# Patient Record
(demographics unavailable — no encounter records)

---

## 2024-11-25 NOTE — PHYSICAL EXAM
[General Appearance - Well Nourished] : well nourished [General Appearance - Well Developed] : well developed [Sclera] : the sclera and conjunctiva were normal [Hearing Threshold Finger Rub Not Coles] : hearing was normal [No CVA Tenderness] : no ~M costovertebral angle tenderness [No Spinal Tenderness] : no spinal tenderness [Nail Clubbing] : no clubbing  or cyanosis of the fingernails [Musculoskeletal - Swelling] : no joint swelling seen [Motor Tone] : muscle strength and tone were normal [FreeTextEntry1] : flexeible  [] : no rash [Skin Lesions] : no skin lesions [Affect] : the affect was normal [Mood] : the mood was normal

## 2024-11-25 NOTE — ASSESSMENT
[FreeTextEntry1] : 29 yo woman with history of morbid obesity, hidradenitis suppurativa, anxiety/depression, ADHD, hypermobility, fibromyalgia, migraine, sleep disturbance, asthma as a child.  Currently on disability for fibro/migraines presents for eval of fatigue.  Fatigue most likley multifactorial -depression, not sleeping, mild fibromyalgia   --patient is advise to start seeing psych given feeling of being overwhlemed --she is to consider a sleep apnea study  --will check labs  --start PT for spine , pending MRA head and neck  --obtain cardio w/u for her syncope  --declines cymbalta today

## 2024-11-25 NOTE — HISTORY OF PRESENT ILLNESS
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  worse involves her back and cervical spine.  presents al all times.  takes Ibuprofen with no pain relief.   recently increased gabapentin to 200 BID she had syncope a few months ago and was seen in the ER and was seen by Dr Chema Vega ( cardio) and told that everything looked normal  in the past she had extensive serologies  negative RF, CCP, SUNNY ( 2020) , 14.3.3 AVISE negative  lyme negative judy mountain negative  early sjogrens negative  collegen type II negative

## 2025-07-17 NOTE — REVIEW OF SYSTEMS
[As Noted in HPI] : as noted in HPI [Feeling Tired] : feeling tired [Abdominal Pain] : abdominal pain [Constipation] : no constipation [Diarrhea] : no diarrhea [Heartburn] : no heartburn [Melena] : no melena [Limb Pain] : limb pain [Negative] : Heme/Lymph

## 2025-07-17 NOTE — PHYSICAL EXAM
[General Appearance - Alert] : alert [General Appearance - In No Acute Distress] : in no acute distress [Sclera] : the sclera and conjunctiva were normal [PERRL With Normal Accommodation] : pupils were equal in size, round, and reactive to light [Extraocular Movements] : extraocular movements were intact [Outer Ear] : the ears and nose were normal in appearance [Oropharynx] : the oropharynx was normal [Neck Appearance] : the appearance of the neck was normal [Jugular Venous Distention Increased] : there was no jugular-venous distention [Exaggerated Use Of Accessory Muscles For Inspiration] : no accessory muscle use [Skin Color & Pigmentation] : normal skin color and pigmentation [Skin Turgor] : normal skin turgor [] : no rash [Sensation] : the sensory exam was normal to light touch and pinprick [No Focal Deficits] : no focal deficits

## 2025-07-17 NOTE — ASSESSMENT
[FreeTextEntry1] :  # Right renal cyst, 4.2 cm, on CT scan from May 15 2025 Discussed possible compilations of renal cyst: infection, rupture, hematuria Get follow up Renal US in  6 months (Jan 2026) to assess for cyst growth Will consider Urology referral in enlarging cyst  # Renal function: SCr 0.8 mg/dL  RTC in 6 months after Renal US is done

## 2025-07-17 NOTE — HISTORY OF PRESENT ILLNESS
[FreeTextEntry1] : HPI:  Ms Bety Haywood is a 31 year old female with Obesity, Hidradenitis Suppurativa, Anxiety/Depression, Fibromyalgia, Migraine, was found to have a large right renal cyst on a CT scan of the abdomen which was performed in mid May 2025 for evaluation of abdominal pain.   She states she was aware of the cyst since her late teens but at that time the cyst size was 2 cm.   She has intermittent anterior abdominal pain with no radiation.  She has upper and lower back pain, which has been ongoing, and attributes that to fibromyalgia. NO recent UTI, no hematuria.